# Patient Record
Sex: FEMALE | Race: OTHER | NOT HISPANIC OR LATINO
[De-identification: names, ages, dates, MRNs, and addresses within clinical notes are randomized per-mention and may not be internally consistent; named-entity substitution may affect disease eponyms.]

---

## 2024-03-28 ENCOUNTER — APPOINTMENT (OUTPATIENT)
Dept: OTOLARYNGOLOGY | Facility: CLINIC | Age: 29
End: 2024-03-28
Payer: COMMERCIAL

## 2024-03-28 ENCOUNTER — TRANSCRIPTION ENCOUNTER (OUTPATIENT)
Age: 29
End: 2024-03-28

## 2024-03-28 VITALS
HEART RATE: 67 BPM | HEIGHT: 62 IN | TEMPERATURE: 97.7 F | BODY MASS INDEX: 31.28 KG/M2 | SYSTOLIC BLOOD PRESSURE: 127 MMHG | DIASTOLIC BLOOD PRESSURE: 94 MMHG | WEIGHT: 170 LBS

## 2024-03-28 PROBLEM — Z00.00 ENCOUNTER FOR PREVENTIVE HEALTH EXAMINATION: Status: ACTIVE | Noted: 2024-03-28

## 2024-03-28 PROCEDURE — 31231 NASAL ENDOSCOPY DX: CPT

## 2024-03-28 PROCEDURE — 99203 OFFICE O/P NEW LOW 30 MIN: CPT | Mod: 25

## 2024-03-28 RX ORDER — PANTOPRAZOLE 40 MG/1
40 TABLET, DELAYED RELEASE ORAL
Qty: 90 | Refills: 3 | Status: ACTIVE | COMMUNITY
Start: 2024-03-28 | End: 1900-01-01

## 2024-03-28 RX ORDER — FAMOTIDINE 40 MG/1
40 TABLET, FILM COATED ORAL
Qty: 90 | Refills: 0 | Status: ACTIVE | COMMUNITY
Start: 2024-03-28 | End: 1900-01-01

## 2024-03-28 RX ORDER — CETIRIZINE HYDROCHLORIDE 10 MG/1
10 TABLET, CHEWABLE ORAL
Refills: 0 | Status: ACTIVE | COMMUNITY

## 2024-03-28 RX ORDER — OXYMETAZOLINE HCL 0.05 %
SPRAY, NON-AEROSOL (ML) NASAL
Refills: 0 | Status: ACTIVE | COMMUNITY

## 2024-03-28 NOTE — ASSESSMENT
[FreeTextEntry1] : 29F here for initial evaluation. Over the past month or so, she c/o postnasal drip, chronic coughing and feeling she is choking on food. She has GERD sx and her diet is terrible. There is right sided clear nasal drainage as well. She also has long standing 'allergies' and takes zyrtec/flonase as needed. On exam, nasal endoscopy shows left septal deviation with inferior turbinate hypertrophy and mild nasopharyngeal erythema. Fiberoptic laryngoscopy shows postcricoid edema. The rest of the head and neck exam is unremarkable. Would favor LPR as majority of sx, along with a chronic rhinitis/turbinate hypertrophy and septal deviation. Will start PPI/H2B with diet/lifestyle. Will also get CT sinus for baseline given length of time of sx. RTO 2 months.

## 2024-03-28 NOTE — PROCEDURE
[FreeTextEntry3] : Fiberoptic Endoscopy: left septal deviation inferior turbinate hypertrophy mild nasopharyngeal erythema upper airway widely patent no masses/lesions TVF fully mobile  negative reservoir test for 15 min postcricoid edema

## 2024-03-28 NOTE — HISTORY OF PRESENT ILLNESS
[de-identified] : 29F here for initial evaluation.  Over the past month or so, she c/o postnasal drip, chronic coughing and feeling she is choking on food. She has GERD sx and her diet is terrible. There is right sided clear nasal drainage as well. She also has long standing 'allergies' and takes zyrtrec/flonase as needed.   ROS otherwise unremarkable.

## 2024-04-29 ENCOUNTER — APPOINTMENT (OUTPATIENT)
Dept: CT IMAGING | Facility: HOSPITAL | Age: 29
End: 2024-04-29
Payer: COMMERCIAL

## 2024-04-29 ENCOUNTER — RESULT REVIEW (OUTPATIENT)
Age: 29
End: 2024-04-29

## 2024-04-29 ENCOUNTER — OUTPATIENT (OUTPATIENT)
Dept: OUTPATIENT SERVICES | Facility: HOSPITAL | Age: 29
LOS: 1 days | End: 2024-04-29

## 2024-04-29 PROCEDURE — 70486 CT MAXILLOFACIAL W/O DYE: CPT

## 2024-04-29 PROCEDURE — 70486 CT MAXILLOFACIAL W/O DYE: CPT | Mod: 26

## 2024-05-23 ENCOUNTER — APPOINTMENT (OUTPATIENT)
Dept: OTOLARYNGOLOGY | Facility: CLINIC | Age: 29
End: 2024-05-23
Payer: COMMERCIAL

## 2024-05-23 DIAGNOSIS — R09.82 POSTNASAL DRIP: ICD-10-CM

## 2024-05-23 DIAGNOSIS — R05.3 CHRONIC COUGH: ICD-10-CM

## 2024-05-23 DIAGNOSIS — J34.89 OTHER SPECIFIED DISORDERS OF NOSE AND NASAL SINUSES: ICD-10-CM

## 2024-05-23 PROCEDURE — 99214 OFFICE O/P EST MOD 30 MIN: CPT

## 2024-05-23 NOTE — DATA REVIEWED
[de-identified] : CT Sinus 4/2024: FINDINGS:  Frothy secretions in the right petrous recess. The bilateral sphenoid sinuses are otherwise well aerated. The sphenoethmoidal recesses are patent. The posterior ethmoid air cells are well aerated.  The bilateral frontal sinuses are well aerated. The bilateral frontal recesses are patent. Anterior ethmoid air cells are well aerated. Bilateral supraorbital air cells.  Trace frothy secretions in the left maxillary sinus. Mild mucosal thickening in the floor of the right maxillary sinus. The lateral maxillary ostium are patent.  Mild mucosal thickening in the nasal cavities. There is a nasal septal deviation to the left. There are no nasal septal bone spurs. Subtle polypoid appearance to the inferior nasal turbinates, right greater than left.  Visualized portions of the brain parenchyma are within normal limits on this designated CT of the paranasal sinuses. The sella is not expanded. The visualized orbits are within normal limits. There is no acute osseous fracture. The visualized mastoid air cells are well aerated. The bilateral TMJs are within normal limits. Multiple missing teeth. Small periapical lucencies in the bilateral maxillary molar teeth.   IMPRESSION:  1.  Mild paranasal sinus disease. 2.  Nasal septal deviation to the left. 3.  Small periapical lucencies in the bilateral maxillary molar teeth. Recommend dental examination for periodontal disease.

## 2024-05-23 NOTE — HISTORY OF PRESENT ILLNESS
[de-identified] : 29F here in followup.  Since last seen, her sx persist. She did not take the reflux meds. Over the past 3 months or so, she c/o postnasal drip, chronic coughing and feeling she is choking on food. She has GERD sx and her diet is terrible. There is nasal congestion/obstruction, left side worse and right sided clear nasal drainage as well. She also has long standing 'allergies' and takes zyrtrec/flonase as needed.  CT Sinus 4/29/24 (I reviewed): -broad left septal deviation -inferior turbinate hypertrophy -no significant inflammatory paranasal sinus disease  ROS otherwise unremarkable.

## 2024-05-23 NOTE — PHYSICAL EXAM
[Nasal Endoscopy Performed] : nasal endoscopy was performed, see procedure section for findings [Midline] : trachea located in midline position [Laryngoscopy Performed] : laryngoscopy was performed, see procedure section for findings [Normal] : no rashes [de-identified] : 2+ left tonsil, 1+ right tonsil

## 2024-05-23 NOTE — ASSESSMENT
[FreeTextEntry1] : 29F here in followup. Since last seen, her sx persist. She did not take the reflux meds. Over the past 3 months or so, she c/o postnasal drip, chronic coughing and feeling she is choking on food. She has GERD sx and her diet is terrible. There is nasal congestion/obstruction, left side worse and right sided clear nasal drainage as well. She also has long standing 'allergies' and takes zyrtrec/flonase as needed. CT sinus shows broad left septal deviation with inferior turbinate hypertrophy and no significant inflammatory paranasal sinus disease. On exam, nasal endoscopy shows left septal deviation with inferior turbinate hypertrophy and mild nasopharyngeal erythema. Fiberoptic laryngoscopy shows postcricoid edema. The rest of the head and neck exam is unremarkable. Still would favor LPR as majority of sx, along with a chronic rhinitis/turbinate hypertrophy and septal deviation. There is no convincing sinusitis on imaging or exam. Reiterated next step to start PPI/H2B with diet/lifestyle. RTO 2 months.